# Patient Record
Sex: MALE | Race: WHITE | Employment: FULL TIME | URBAN - METROPOLITAN AREA
[De-identification: names, ages, dates, MRNs, and addresses within clinical notes are randomized per-mention and may not be internally consistent; named-entity substitution may affect disease eponyms.]

---

## 2017-08-08 ENCOUNTER — APPOINTMENT (OUTPATIENT)
Dept: LAB | Age: 47
End: 2017-08-08
Attending: INTERNAL MEDICINE
Payer: COMMERCIAL

## 2017-08-11 ENCOUNTER — LAB ENCOUNTER (OUTPATIENT)
Dept: LAB | Age: 47
End: 2017-08-11
Attending: DENTIST
Payer: COMMERCIAL

## 2017-08-11 DIAGNOSIS — Z00.01 ENCOUNTER FOR GENERAL ADULT MEDICAL EXAMINATION WITH ABNORMAL FINDINGS: Primary | ICD-10-CM

## 2017-08-11 LAB
ALBUMIN SERPL-MCNC: 4 G/DL (ref 3.5–4.8)
ALP LIVER SERPL-CCNC: 71 U/L (ref 45–117)
ALT SERPL-CCNC: 48 U/L (ref 17–63)
AST SERPL-CCNC: 28 U/L (ref 15–41)
BASOPHILS # BLD AUTO: 0.07 X10(3) UL (ref 0–0.1)
BASOPHILS NFR BLD AUTO: 1.2 %
BILIRUB SERPL-MCNC: 0.7 MG/DL (ref 0.1–2)
BILIRUB UR QL STRIP.AUTO: NEGATIVE
BUN BLD-MCNC: 11 MG/DL (ref 8–20)
CALCIUM BLD-MCNC: 9.3 MG/DL (ref 8.3–10.3)
CHLORIDE: 109 MMOL/L (ref 101–111)
CHOLEST SMN-MCNC: 225 MG/DL (ref ?–200)
CLARITY UR REFRACT.AUTO: CLEAR
CO2: 25 MMOL/L (ref 22–32)
COLOR UR AUTO: YELLOW
CREAT BLD-MCNC: 0.74 MG/DL (ref 0.7–1.3)
EOSINOPHIL # BLD AUTO: 0.06 X10(3) UL (ref 0–0.3)
EOSINOPHIL NFR BLD AUTO: 1.1 %
ERYTHROCYTE [DISTWIDTH] IN BLOOD BY AUTOMATED COUNT: 13.2 % (ref 11.5–16)
GLUCOSE BLD-MCNC: 93 MG/DL (ref 70–99)
GLUCOSE UR STRIP.AUTO-MCNC: NEGATIVE MG/DL
HCT VFR BLD AUTO: 44.1 % (ref 37–53)
HDLC SERPL-MCNC: 63 MG/DL (ref 45–?)
HDLC SERPL: 3.57 {RATIO} (ref ?–4.97)
HGB BLD-MCNC: 15 G/DL (ref 13–17)
IMMATURE GRANULOCYTE COUNT: 0.02 X10(3) UL (ref 0–1)
IMMATURE GRANULOCYTE RATIO %: 0.4 %
KETONES UR STRIP.AUTO-MCNC: NEGATIVE MG/DL
LDLC SERPL CALC-MCNC: 135 MG/DL (ref ?–130)
LDLC SERPL-MCNC: 27 MG/DL (ref 5–40)
LEUKOCYTE ESTERASE UR QL STRIP.AUTO: NEGATIVE
LYMPHOCYTES # BLD AUTO: 2.26 X10(3) UL (ref 0.9–4)
LYMPHOCYTES NFR BLD AUTO: 40.1 %
M PROTEIN MFR SERPL ELPH: 7.4 G/DL (ref 6.1–8.3)
MCH RBC QN AUTO: 30.8 PG (ref 27–33.2)
MCHC RBC AUTO-ENTMCNC: 34 G/DL (ref 31–37)
MCV RBC AUTO: 90.6 FL (ref 80–99)
MONOCYTES # BLD AUTO: 0.57 X10(3) UL (ref 0.1–0.6)
MONOCYTES NFR BLD AUTO: 10.1 %
NEUTROPHIL ABS PRELIM: 2.66 X10 (3) UL (ref 1.3–6.7)
NEUTROPHILS # BLD AUTO: 2.66 X10(3) UL (ref 1.3–6.7)
NEUTROPHILS NFR BLD AUTO: 47.1 %
NITRITE UR QL STRIP.AUTO: NEGATIVE
NONHDLC SERPL-MCNC: 162 MG/DL (ref ?–130)
PH UR STRIP.AUTO: 5 [PH] (ref 4.5–8)
PLATELET # BLD AUTO: 256 10(3)UL (ref 150–450)
POTASSIUM SERPL-SCNC: 4.4 MMOL/L (ref 3.6–5.1)
PROT UR STRIP.AUTO-MCNC: NEGATIVE MG/DL
PSA SERPL-MCNC: 0.77 NG/ML (ref 0.01–4)
RBC # BLD AUTO: 4.87 X10(6)UL (ref 4.3–5.7)
RBC UR QL AUTO: NEGATIVE
RED CELL DISTRIBUTION WIDTH-SD: 43.7 FL (ref 35.1–46.3)
SODIUM SERPL-SCNC: 141 MMOL/L (ref 136–144)
SP GR UR STRIP.AUTO: 1.02 (ref 1–1.03)
THYROXINE (T4): 5.2 UG/DL (ref 4.5–10.9)
TRIGLYCERIDES: 136 MG/DL (ref ?–150)
TSI SER-ACNC: 1.35 MIU/ML (ref 0.35–5.5)
UROBILINOGEN UR STRIP.AUTO-MCNC: <2 MG/DL
WBC # BLD AUTO: 5.6 X10(3) UL (ref 4–13)

## 2017-08-11 PROCEDURE — 80053 COMPREHEN METABOLIC PANEL: CPT

## 2017-08-11 PROCEDURE — 84403 ASSAY OF TOTAL TESTOSTERONE: CPT

## 2017-08-11 PROCEDURE — 84436 ASSAY OF TOTAL THYROXINE: CPT

## 2017-08-11 PROCEDURE — 84443 ASSAY THYROID STIM HORMONE: CPT

## 2017-08-11 PROCEDURE — 84153 ASSAY OF PSA TOTAL: CPT

## 2017-08-11 PROCEDURE — 36415 COLL VENOUS BLD VENIPUNCTURE: CPT

## 2017-08-11 PROCEDURE — 84402 ASSAY OF FREE TESTOSTERONE: CPT

## 2017-08-11 PROCEDURE — 81003 URINALYSIS AUTO W/O SCOPE: CPT

## 2017-08-11 PROCEDURE — 80061 LIPID PANEL: CPT

## 2017-08-11 PROCEDURE — 85025 COMPLETE CBC W/AUTO DIFF WBC: CPT

## 2017-08-14 LAB
TESTOSTERONE TOTAL: 95.9 NG/DL
TESTOSTERONE, FREE -MS/MS: 26.7 PG/ML

## 2017-10-12 ENCOUNTER — PRIOR ORIGINAL RECORDS (OUTPATIENT)
Dept: OTHER | Age: 47
End: 2017-10-12

## 2017-12-31 ENCOUNTER — PRIOR ORIGINAL RECORDS (OUTPATIENT)
Dept: OTHER | Age: 47
End: 2017-12-31

## 2019-01-24 ENCOUNTER — PRIOR ORIGINAL RECORDS (OUTPATIENT)
Dept: OTHER | Age: 49
End: 2019-01-24

## 2019-01-26 ENCOUNTER — PRIOR ORIGINAL RECORDS (OUTPATIENT)
Dept: OTHER | Age: 49
End: 2019-01-26

## 2019-01-30 ENCOUNTER — LAB ENCOUNTER (OUTPATIENT)
Dept: LAB | Age: 49
End: 2019-01-30
Attending: INTERNAL MEDICINE
Payer: COMMERCIAL

## 2019-01-30 DIAGNOSIS — C62.10: ICD-10-CM

## 2019-01-30 DIAGNOSIS — Z00.01 ENCOUNTER FOR GENERAL ADULT MEDICAL EXAMINATION WITH ABNORMAL FINDINGS: Primary | ICD-10-CM

## 2019-01-30 LAB
AFP-TM SERPL-MCNC: 2.6 NG/ML (ref ?–8)
ALBUMIN SERPL-MCNC: 4.2 G/DL (ref 3.1–4.5)
ALBUMIN/GLOB SERPL: 1.1 {RATIO} (ref 1–2)
ALP LIVER SERPL-CCNC: 80 U/L (ref 45–117)
ALT SERPL-CCNC: 43 U/L (ref 17–63)
ANION GAP SERPL CALC-SCNC: 9 MMOL/L (ref 0–18)
AST SERPL-CCNC: 29 U/L (ref 15–41)
B-HCG SERPL-ACNC: <1 MIU/ML
BASOPHILS # BLD AUTO: 0.05 X10(3) UL (ref 0–0.2)
BASOPHILS NFR BLD AUTO: 0.9 %
BILIRUB SERPL-MCNC: 0.9 MG/DL (ref 0.1–2)
BILIRUB UR QL STRIP.AUTO: NEGATIVE
BUN BLD-MCNC: 14 MG/DL (ref 8–20)
BUN/CREAT SERPL: 16.1 (ref 10–20)
CALCIUM BLD-MCNC: 9.7 MG/DL (ref 8.3–10.3)
CHLORIDE SERPL-SCNC: 106 MMOL/L (ref 101–111)
CHOLEST SMN-MCNC: 240 MG/DL (ref ?–200)
CLARITY UR REFRACT.AUTO: CLEAR
CO2 SERPL-SCNC: 26 MMOL/L (ref 22–32)
COLOR UR AUTO: YELLOW
CREAT BLD-MCNC: 0.87 MG/DL (ref 0.7–1.3)
DEPRECATED RDW RBC AUTO: 40.9 FL (ref 35.1–46.3)
EOSINOPHIL # BLD AUTO: 0.16 X10(3) UL (ref 0–0.7)
EOSINOPHIL NFR BLD AUTO: 2.8 %
ERYTHROCYTE [DISTWIDTH] IN BLOOD BY AUTOMATED COUNT: 12.4 % (ref 11–15)
GLOBULIN PLAS-MCNC: 3.9 G/DL (ref 2.8–4.4)
GLUCOSE BLD-MCNC: 119 MG/DL (ref 70–99)
GLUCOSE UR STRIP.AUTO-MCNC: NEGATIVE MG/DL
HCT VFR BLD AUTO: 46.5 % (ref 39–53)
HDLC SERPL-MCNC: 60 MG/DL (ref 40–59)
HGB BLD-MCNC: 15.7 G/DL (ref 13–17.5)
IMM GRANULOCYTES # BLD AUTO: 0.01 X10(3) UL (ref 0–1)
IMM GRANULOCYTES NFR BLD: 0.2 %
LDLC SERPL CALC-MCNC: 149 MG/DL (ref ?–100)
LEUKOCYTE ESTERASE UR QL STRIP.AUTO: NEGATIVE
LYMPHOCYTES # BLD AUTO: 2 X10(3) UL (ref 1–4)
LYMPHOCYTES NFR BLD AUTO: 34.5 %
M PROTEIN MFR SERPL ELPH: 8.1 G/DL (ref 6.4–8.2)
MCH RBC QN AUTO: 30.2 PG (ref 26–34)
MCHC RBC AUTO-ENTMCNC: 33.8 G/DL (ref 31–37)
MCV RBC AUTO: 89.4 FL (ref 80–100)
MONOCYTES # BLD AUTO: 0.61 X10(3) UL (ref 0.1–1)
MONOCYTES NFR BLD AUTO: 10.5 %
NEUTROPHILS # BLD AUTO: 2.96 X10 (3) UL (ref 1.5–7.7)
NEUTROPHILS # BLD AUTO: 2.96 X10(3) UL (ref 1.5–7.7)
NEUTROPHILS NFR BLD AUTO: 51.1 %
NITRITE UR QL STRIP.AUTO: NEGATIVE
NONHDLC SERPL-MCNC: 180 MG/DL (ref ?–130)
OSMOLALITY SERPL CALC.SUM OF ELEC: 294 MOSM/KG (ref 275–295)
PH UR STRIP.AUTO: 5 [PH] (ref 4.5–8)
PLATELET # BLD AUTO: 237 10(3)UL (ref 150–450)
POTASSIUM SERPL-SCNC: 4.8 MMOL/L (ref 3.6–5.1)
PROT UR STRIP.AUTO-MCNC: NEGATIVE MG/DL
RBC # BLD AUTO: 5.2 X10(6)UL (ref 4.3–5.7)
RBC UR QL AUTO: NEGATIVE
SODIUM SERPL-SCNC: 141 MMOL/L (ref 136–144)
SP GR UR STRIP.AUTO: 1.02 (ref 1–1.03)
T4 SERPL-MCNC: 5.3 UG/DL (ref 4.5–10.9)
TRIGL SERPL-MCNC: 155 MG/DL (ref 30–149)
TSI SER-ACNC: 1.65 MIU/ML (ref 0.35–5.5)
UROBILINOGEN UR STRIP.AUTO-MCNC: <2 MG/DL
VLDLC SERPL CALC-MCNC: 31 MG/DL (ref 0–30)
WBC # BLD AUTO: 5.8 X10(3) UL (ref 4–11)

## 2019-01-30 PROCEDURE — 85025 COMPLETE CBC W/AUTO DIFF WBC: CPT

## 2019-01-30 PROCEDURE — 80061 LIPID PANEL: CPT

## 2019-01-30 PROCEDURE — 84436 ASSAY OF TOTAL THYROXINE: CPT

## 2019-01-30 PROCEDURE — 84702 CHORIONIC GONADOTROPIN TEST: CPT

## 2019-01-30 PROCEDURE — 81003 URINALYSIS AUTO W/O SCOPE: CPT

## 2019-01-30 PROCEDURE — 36415 COLL VENOUS BLD VENIPUNCTURE: CPT

## 2019-01-30 PROCEDURE — 84443 ASSAY THYROID STIM HORMONE: CPT

## 2019-01-30 PROCEDURE — 80053 COMPREHEN METABOLIC PANEL: CPT

## 2019-01-30 PROCEDURE — 82105 ALPHA-FETOPROTEIN SERUM: CPT

## 2019-02-02 LAB — BETA HCG QUANT (TUMOR MARKER): <1 IU/L

## 2019-08-07 ENCOUNTER — OFFICE VISIT (OUTPATIENT)
Dept: OTHER | Facility: HOSPITAL | Age: 49
End: 2019-08-07
Attending: PREVENTIVE MEDICINE

## 2019-08-07 DIAGNOSIS — Z01.84 IMMUNITY STATUS TESTING: Primary | ICD-10-CM

## 2019-08-07 LAB
HBV SURFACE AB SER QL: NONREACTIVE
HBV SURFACE AB SERPL IA-ACNC: <3.1 MIU/ML

## 2019-08-07 PROCEDURE — 86706 HEP B SURFACE ANTIBODY: CPT

## 2020-01-08 ENCOUNTER — PRIOR ORIGINAL RECORDS (OUTPATIENT)
Dept: OTHER | Age: 50
End: 2020-01-08

## 2020-01-30 ENCOUNTER — LAB ENCOUNTER (OUTPATIENT)
Dept: LAB | Age: 50
End: 2020-01-30
Attending: INTERNAL MEDICINE
Payer: COMMERCIAL

## 2020-01-30 DIAGNOSIS — E29.1 TESTICULAR FAILURE: Primary | ICD-10-CM

## 2020-01-30 PROCEDURE — 36415 COLL VENOUS BLD VENIPUNCTURE: CPT

## 2020-01-30 PROCEDURE — 84402 ASSAY OF FREE TESTOSTERONE: CPT

## 2020-01-30 PROCEDURE — 84403 ASSAY OF TOTAL TESTOSTERONE: CPT

## 2020-02-04 LAB
TESTOSTERONE TOTAL: 41.8 NG/DL
TESTOSTERONE, FREE -MS/MS: 12.7 PG/ML

## 2020-02-10 ENCOUNTER — HOSPITAL ENCOUNTER (OUTPATIENT)
Dept: GENERAL RADIOLOGY | Age: 50
Discharge: HOME | End: 2020-02-10
Attending: INTERNAL MEDICINE
Payer: COMMERCIAL

## 2020-02-10 DIAGNOSIS — R05.9 COUGH: ICD-10-CM

## 2020-02-10 PROCEDURE — 71046 X-RAY EXAM CHEST 2 VIEWS: CPT | Performed by: INTERNAL MEDICINE

## 2020-10-11 VITALS
DIASTOLIC BLOOD PRESSURE: 84 MMHG | SYSTOLIC BLOOD PRESSURE: 132 MMHG | BODY MASS INDEX: 30.91 KG/M2 | WEIGHT: 233.2 LBS | HEIGHT: 73 IN

## 2020-12-31 ENCOUNTER — PRIOR ORIGINAL RECORDS (OUTPATIENT)
Dept: OTHER | Age: 50
End: 2020-12-31

## 2021-01-18 ENCOUNTER — ORDER TRANSCRIPTION (OUTPATIENT)
Dept: PHYSICAL THERAPY | Facility: HOSPITAL | Age: 51
End: 2021-01-18

## 2021-01-18 DIAGNOSIS — S76.012A STRAIN OF HIP AND THIGH, LEFT, INITIAL ENCOUNTER: Primary | ICD-10-CM

## 2021-01-18 DIAGNOSIS — S76.912A STRAIN OF HIP AND THIGH, LEFT, INITIAL ENCOUNTER: Primary | ICD-10-CM

## 2021-06-02 ENCOUNTER — HOSPITAL ENCOUNTER (OUTPATIENT)
Dept: CT IMAGING | Age: 51
Discharge: HOME | End: 2021-06-02
Attending: INTERNAL MEDICINE

## 2021-06-02 DIAGNOSIS — Z13.6 SCREENING FOR CARDIOVASCULAR CONDITION: ICD-10-CM

## 2021-06-24 ENCOUNTER — TELEPHONE (OUTPATIENT)
Dept: CARDIAC REHAB | Facility: HOSPITAL | Age: 51
End: 2021-06-24

## 2021-06-30 ENCOUNTER — TELEPHONE (OUTPATIENT)
Dept: CARDIAC REHAB | Facility: HOSPITAL | Age: 51
End: 2021-06-30

## 2022-08-17 ENCOUNTER — TELEPHONE (OUTPATIENT)
Dept: PHYSICAL THERAPY | Facility: HOSPITAL | Age: 52
End: 2022-08-17

## 2022-08-17 ENCOUNTER — ORDER TRANSCRIPTION (OUTPATIENT)
Dept: PHYSICAL THERAPY | Facility: HOSPITAL | Age: 52
End: 2022-08-17

## 2022-08-17 DIAGNOSIS — M22.41 CHONDROMALACIA PATELLAE, RIGHT KNEE: ICD-10-CM

## 2022-08-17 DIAGNOSIS — M22.42 CHONDROMALACIA PATELLAE, LEFT KNEE: Primary | ICD-10-CM

## 2022-09-16 ENCOUNTER — TELEPHONE (OUTPATIENT)
Dept: HEMATOLOGY/ONCOLOGY | Facility: HOSPITAL | Age: 52
End: 2022-09-16

## 2022-09-28 ENCOUNTER — OFFICE VISIT (OUTPATIENT)
Dept: HEMATOLOGY/ONCOLOGY | Age: 52
End: 2022-09-28
Attending: INTERNAL MEDICINE

## 2022-09-28 VITALS
HEART RATE: 87 BPM | HEIGHT: 73.5 IN | DIASTOLIC BLOOD PRESSURE: 91 MMHG | BODY MASS INDEX: 29.4 KG/M2 | TEMPERATURE: 97 F | SYSTOLIC BLOOD PRESSURE: 162 MMHG | OXYGEN SATURATION: 96 % | WEIGHT: 226.63 LBS

## 2022-09-28 DIAGNOSIS — Z85.47 HISTORY OF TESTICULAR CANCER: Primary | ICD-10-CM

## 2022-09-28 PROCEDURE — 99243 OFF/OP CNSLTJ NEW/EST LOW 30: CPT | Performed by: INTERNAL MEDICINE

## 2022-09-28 RX ORDER — ATORVASTATIN CALCIUM 20 MG/1
TABLET, FILM COATED ORAL
COMMUNITY
Start: 2022-09-23

## 2022-09-28 RX ORDER — CHOLECALCIFEROL (VITAMIN D3) 125 MCG
1 CAPSULE ORAL AS DIRECTED
COMMUNITY

## 2022-09-28 NOTE — PROGRESS NOTES
Patient is here for MD consult. Hx of testicular cancer. Diagnosed in 2002. At that time he had surgery and chemo. He has not had a follow up in quite some time. He would like to establish care at [facility]. He is feeling well. Denies pain. No lymphadenopathy.        Education Record    Learner:  Patient    Disease / Diagnosis:  Consult     Barriers / Limitations:  None   Comments:    Method:  Discussion   Comments:    General Topics:  Plan of care reviewed   Comments:    Outcome:  Shows understanding   Comments:

## 2022-10-04 ENCOUNTER — ORDER TRANSCRIPTION (OUTPATIENT)
Dept: PHYSICAL THERAPY | Facility: HOSPITAL | Age: 52
End: 2022-10-04

## 2022-10-04 DIAGNOSIS — M22.42 CHONDROMALACIA OF LEFT PATELLA: Primary | ICD-10-CM

## 2022-10-04 DIAGNOSIS — M22.41 CHONDROMALACIA OF RIGHT PATELLA: ICD-10-CM

## 2022-10-05 ENCOUNTER — TELEPHONE (OUTPATIENT)
Dept: PHYSICAL THERAPY | Facility: HOSPITAL | Age: 52
End: 2022-10-05

## 2022-10-06 ENCOUNTER — TELEPHONE (OUTPATIENT)
Dept: PHYSICAL THERAPY | Facility: HOSPITAL | Age: 52
End: 2022-10-06

## 2022-10-12 ENCOUNTER — TELEPHONE (OUTPATIENT)
Dept: PHYSICAL THERAPY | Facility: HOSPITAL | Age: 52
End: 2022-10-12

## 2022-10-13 ENCOUNTER — OFFICE VISIT (OUTPATIENT)
Dept: PHYSICAL THERAPY | Age: 52
End: 2022-10-13
Attending: INTERNAL MEDICINE
Payer: COMMERCIAL

## 2022-10-13 DIAGNOSIS — M25.561 ACUTE PAIN OF BOTH KNEES: Primary | ICD-10-CM

## 2022-10-13 DIAGNOSIS — M25.562 ACUTE PAIN OF BOTH KNEES: Primary | ICD-10-CM

## 2022-10-13 DIAGNOSIS — M79.652 PAIN IN BOTH THIGHS: ICD-10-CM

## 2022-10-13 DIAGNOSIS — M79.651 PAIN IN BOTH THIGHS: ICD-10-CM

## 2022-10-13 PROCEDURE — 97110 THERAPEUTIC EXERCISES: CPT

## 2022-10-13 PROCEDURE — 97161 PT EVAL LOW COMPLEX 20 MIN: CPT

## 2022-10-18 ENCOUNTER — OFFICE VISIT (OUTPATIENT)
Dept: PHYSICAL THERAPY | Age: 52
End: 2022-10-18
Attending: INTERNAL MEDICINE
Payer: COMMERCIAL

## 2022-10-18 DIAGNOSIS — M79.651 PAIN IN BOTH THIGHS: ICD-10-CM

## 2022-10-18 DIAGNOSIS — M25.562 ACUTE PAIN OF BOTH KNEES: Primary | ICD-10-CM

## 2022-10-18 DIAGNOSIS — M25.561 ACUTE PAIN OF BOTH KNEES: Primary | ICD-10-CM

## 2022-10-18 DIAGNOSIS — M79.652 PAIN IN BOTH THIGHS: ICD-10-CM

## 2022-10-18 PROCEDURE — 97110 THERAPEUTIC EXERCISES: CPT

## 2022-10-18 NOTE — PROGRESS NOTES
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 He also feels it when climbing stairs, or when biking. Did stretches at home, going ok. Pain: 5/10   @eval: Toshia Perry is a 54year old male who presents to therapy today with complaints of pain in Lf lateral knee/thigh, and some soreness/fatigue in thighs and pressure in kneecaps, over the past few months. Pt is , so sitting in the car a lot, which exacerbates his thigh/anterior knee discomfort after 10 minutes or more. Also feels dull ache/pressure in anterior thighs c stairs. Discomfort does not awaken him overnight. No previous injuries to knees. He also gets discomfort across low back if he is sitting for prolonged period; also feels it if he stands for too long, has to pause to stretch/stand straight up when he gets out of car, has been going on for many years and no change as of late. Went to a chiropractor for a period, a couple years ago. Pt describes pain level constant 2/10 thighs/ant knees, at worst intermittent 3-4/10 at lateral Lf knee. Current functional limitations include UA to run on treadmill 2o LE fatigue/discomfort. Con Dense describes prior level of function 30-40min walking on incline treadmill a couple times a week. Pt goals include make the pain go away. Past medical history was reviewed with Sd Padilla. Significant findings include hyperlipidemia, hx of testicular CA, PE.     Objective:   Pt reports momentary back discomfort c bed mobility during session today.  He reports also occasional twinges in low back if playing with his daughter on the floor, diffiulty getting up from floor. (note: at eval lumbar screen, mild restriction and discomfort c lumbar extension)  Patellar mobility WNL B  TBA future visit: B and SL squat, stepups    Treatment:  Manual Therapy: Added STM B quads c rollerstick 5min    Therapeutic Exercise:  Supine HS c strap 20\"x3 ea Rt, Lf   Prone quad c strap 20\"x3 ea Rt, Lf   SLR 5\"x10 ea Rt, Lf   Added Bridges 5\"x10  Added 4pt core stabilization c alt UE flexion 1min  Added 4pt core stabilization c alt LE extension 1min  Added seated core stab c UE depr into red SB 5\"x2min  Seated 3-way lumbar flexion slides on table 10\"x3 ea Rt, Lf  Added sidestepping YTB 2min    HEP: initial-supine HS stretch c strap, prone quad stretch c strap, SLR, seated lumbar lateral flexion stretch at table   10-18-22 pt to add sidestepping vs YTB    Assessment/Plan: pt reports continued symptoms isolated to sitting in squat car, stairs, or biking. He tolerated new ex's today to promote LE strength, but also for increase core stabilization to offer increased intrinsic lumbar support. Continue to monitor symptoms and address defiicts to work toward PLOF and set goals to resolve symptoms. PLAN OF CARE:    Goals: (to be met in 8 visits)  1. 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 Pt consistently able to transition from sitting to standing/walking without hesitation, need to stretch, or discomfort for PLOF  3. Pt consistently able to traverse stairs reciprocally s any LE discomfort for PLOF  4. incr hip abd/ext MMT at least 1/2 grade painfree for incr mm support to knees and lumbopelvis for decr symtpoms  5. incr B hamstring and quad flexibility to allow decr patellofemoral compression at rest for decr symptoms  6.  Pt able to tolerate 30 min incline treadmill walk s pain for PLOF  7. indep HEP to promote cont progress toward functional goals    Frequency / Duration: Patient will be seen for 2 x/week or a total of 8 visits over a 90 day period. All Treatments performed at distinct and separate times during the therapy session.   Treatment Minutes  Units charged   Manual Therapy 5 minutes    Therapeutic Activity 0 minutes    Neuromuscular Re-education 0 minutes    Therapeutic Exercise 40 minutes 3   Total Direct Treatment Time 45 minutes

## 2022-10-21 ENCOUNTER — OFFICE VISIT (OUTPATIENT)
Dept: PHYSICAL THERAPY | Age: 52
End: 2022-10-21
Attending: INTERNAL MEDICINE
Payer: COMMERCIAL

## 2022-10-21 DIAGNOSIS — M79.651 PAIN IN BOTH THIGHS: ICD-10-CM

## 2022-10-21 DIAGNOSIS — M25.562 ACUTE PAIN OF BOTH KNEES: Primary | ICD-10-CM

## 2022-10-21 DIAGNOSIS — M79.652 PAIN IN BOTH THIGHS: ICD-10-CM

## 2022-10-21 DIAGNOSIS — M25.561 ACUTE PAIN OF BOTH KNEES: Primary | ICD-10-CM

## 2022-10-21 PROCEDURE — 97110 THERAPEUTIC EXERCISES: CPT

## 2022-10-21 NOTE — PROGRESS NOTES
PT DAILY NOTE  Diagnosis:   Acute pain of both knees (M25.561, M25.562)  Pain in both thighs (M79.671, K62.555)    Referring Provider: Gomez Siemens Date of Evaluation:    10/13/2022    Precautions:  None Next MD visit:   none scheduled  Date of Surgery: n/a  Symptom onset: past few months     Insurance Primary/Secondary: [employer]     Visit 3 of 8   Date POC Expires: 12-31-22       Subjective: Pt states he could tell he felt looser in thighs after last session. He does continue to feel a fatigued feeling in thighs when sitting in squad car, like he had a heavy squatting workout. Not painful, just a tightness. He also notes for several years when he is sitting, he just can't get comfortable, like he has to move/restless legs 2o discomfort B groin. Pain: 0/10   @eval: Saul Burgos is a 54year old male who presents to therapy today with complaints of pain in Lf lateral knee/thigh, and some soreness/fatigue in thighs and pressure in kneecaps, over the past few months. Pt is , so sitting in the car a lot, which exacerbates his thigh/anterior knee discomfort after 10 minutes or more. Also feels dull ache/pressure in anterior thighs c stairs. Discomfort does not awaken him overnight. No previous injuries to knees. He also gets discomfort across low back if he is sitting for prolonged period; also feels it if he stands for too long, has to pause to stretch/stand straight up when he gets out of car, has been going on for many years and no change as of late. Went to a chiropractor for a period, a couple years ago. Pt describes pain level constant 2/10 thighs/ant knees, at worst intermittent 3-4/10 at lateral Lf knee. Current functional limitations include UA to run on treadmill 2o LE fatigue/discomfort. Niesha Cruz describes prior level of function 30-40min walking on incline treadmill a couple times a week. Pt goals include make the pain go away. Past medical history was reviewed with Niesha Cruz. Significant findings include hyperlipidemia, hx of testicular CA, PE.     Objective:   Pt did not report any pain c bed mobility today, which he did last visit  Pt requires cueing for proper form c wall squat to decr anterior knee stress; tolerated 10 reps c fatigue but no pain  TBA future visit: B and SL squat, stepups    Treatment:(\"NP\" indicates Not Performed this date)  Manual Therapy:  STM B quads c rollerstick 5min    Therapeutic Exercise:  Upright bike for CKC strengthening and ROM 5min  Supine HS c strap 20\"x3 ea Rt, Lf   Prone quad c strap 20\"x3 ea Rt, Lf   SLR 5\"x10 ea Rt, Lf   Bridges 5\"x15  4pt core stabilization c alt LE extension 2min (increased)  seated core stab c UE depr into red SB 5\"x2min  Seated 3-way lumbar flexion slides on table 10\"x3 ea Rt, Lf  sidestepping YTB 2min  Added paloff press c 2-plates 3\"H97 ea Rt, Lf   Added 1/4 Wall squats c SB 5\"x10    HEP: initial-supine HS stretch c strap, prone quad stretch c strap, SLR, seated lumbar lateral flexion stretch at table   10-18-22 pt to add sidestepping vs YTB    Assessment/Plan: pt did feel 'looser' in legs after last visit; difficult to keep HEP compliance during work week but now he will be off for 3 days. Reports chronic back discomfort and 'restlessness' in B hips/groin when sitting that has gone on many years. His symptoms are always when sitting. If pt gets no relief from PT course of care, lumbar workup may be warranted. Continue progressing core and hip/LE strength as tolerated. PLAN OF CARE:    Goals: (to be met in 8 visits)  1. 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 Pt consistently able to transition from sitting to standing/walking without hesitation, need to stretch, or discomfort for PLOF  3.  Pt consistently able to traverse stairs reciprocally s any LE discomfort for PLOF  4. incr hip abd/ext MMT at least 1/2 grade painfree for incr mm support to knees and lumbopelvis for decr symtpoms  5. incr B hamstring and quad flexibility to allow decr patellofemoral compression at rest for decr symptoms  6. Pt able to tolerate 30 min incline treadmill walk s pain for PLOF  7. indep HEP to promote cont progress toward functional goals    Frequency / Duration: Patient will be seen for 2 x/week or a total of 8 visits over a 90 day period. All Treatments performed at distinct and separate times during the therapy session.   Treatment Minutes  Units charged   Manual Therapy 5 minutes    Therapeutic Activity 0 minutes    Neuromuscular Re-education 0 minutes    Therapeutic Exercise 40 minutes 3   Total Direct Treatment Time 45 minutes

## 2022-10-26 ENCOUNTER — OFFICE VISIT (OUTPATIENT)
Dept: PHYSICAL THERAPY | Age: 52
End: 2022-10-26
Attending: INTERNAL MEDICINE
Payer: COMMERCIAL

## 2022-10-26 DIAGNOSIS — M79.652 PAIN IN BOTH THIGHS: ICD-10-CM

## 2022-10-26 DIAGNOSIS — M79.651 PAIN IN BOTH THIGHS: ICD-10-CM

## 2022-10-26 DIAGNOSIS — M25.561 ACUTE PAIN OF BOTH KNEES: Primary | ICD-10-CM

## 2022-10-26 DIAGNOSIS — M25.562 ACUTE PAIN OF BOTH KNEES: Primary | ICD-10-CM

## 2022-10-26 PROCEDURE — 97110 THERAPEUTIC EXERCISES: CPT

## 2022-10-26 NOTE — PROGRESS NOTES
PT DAILY NOTE  Diagnosis:   Acute pain of both knees (M25.561, M25.562)  Pain in both thighs (M79.671, Y18.672)    Referring Provider: Lester Vilella Date of Evaluation:    10/13/2022    Precautions:  None Next MD visit:   none scheduled  Date of Surgery: n/a  Symptom onset: past few months     Insurance Primary/Secondary: [employer]     Visit 4 of 8   Date POC Expires: 12-31-22       Subjective: Pt states he continues to have constant thigh tightness/discomfort when sitting, but it hasn't been as intense/less discomfort in nature. Pain: 0/10   @eval: Diogenes Friedman is a 54year old male who presents to therapy today with complaints of pain in Lf lateral knee/thigh, and some soreness/fatigue in thighs and pressure in kneecaps, over the past few months. Pt is , so sitting in the car a lot, which exacerbates his thigh/anterior knee discomfort after 10 minutes or more. Also feels dull ache/pressure in anterior thighs c stairs. Discomfort does not awaken him overnight. No previous injuries to knees. He also gets discomfort across low back if he is sitting for prolonged period; also feels it if he stands for too long, has to pause to stretch/stand straight up when he gets out of car, has been going on for many years and no change as of late. Went to a chiropractor for a period, a couple years ago. Pt describes pain level constant 2/10 thighs/ant knees, at worst intermittent 3-4/10 at lateral Lf knee. Current functional limitations include UA to run on treadmill 2o LE fatigue/discomfort. Camille Newman describes prior level of function 30-40min walking on incline treadmill a couple times a week. Pt goals include make the pain go away. Past medical history was reviewed with Camille Newman.  Significant findings include hyperlipidemia, hx of testicular CA, PE.     Objective:   Pt reports central low back discomfort after completing seated core stab UE pushdown into ball today - pt ed to avoid lumbar extension/activating extensors during this exercise, to stay in pelvic neutral and focus on engaging core. Pt to report pain if this occurs in future, as it occurs. Treatment:(\"NP\" indicates Not Performed this date)  Manual Therapy:  STM B quads c FR 5min    Therapeutic Exercise:  Upright bike for CKC strengthening and ROM 5min  Supine HS c strap 20\"x3 ea Rt, Lf   Prone quad c strap 20\"x3 ea Rt, Lf   SLR 1# 5\"x10 ea Rt, Lf (increased)  Bridges 5\"x15  4pt core stabilization c alt LE extension 2min  seated core stab c UE depr into red SB 5\"x2min  Seated 3-way lumbar flexion slides on table 10\"x3 ea Rt, Lf  sidestepping YTB 2min  paloff press c 2-plates 1\"K80 ea Rt, Lf   1/4 Wall squats c SB 5\"x10  Added forearm plank to table ht 66cm 1minx2    Future sessions: supine dead bug    HEP: initial-supine HS stretch c strap, prone quad stretch c strap, SLR, seated lumbar lateral flexion stretch at table   10-18-22 pt to add sidestepping vs YTB   10-26-22 pt to add wall squats and forearm plank to table surface    Assessment/Plan: pt reports mild improvement in severity of symptoms since last visit. He reports central low back discomfort after seated core stab exercise, likely 2o incr extensor activation. Pt educated to avoid this or alert therapist if pain does not resolve. He tolerated new forearm plank to table exercise, and is to add this and wall squats to HEP. Cont to address deficits. PLAN OF CARE:    Goals: (to be met in 8 visits)  1. 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 Pt consistently able to transition from sitting to standing/walking without hesitation, need to stretch, or discomfort for PLOF  3. Pt consistently able to traverse stairs reciprocally s any LE discomfort for PLOF  4. incr hip abd/ext MMT at least 1/2 grade painfree for incr mm support to knees and lumbopelvis for decr symtpoms  5.  incr B hamstring and quad flexibility to allow decr patellofemoral compression at rest for decr symptoms  6. Pt able to tolerate 30 min incline treadmill walk s pain for PLOF  7. indep HEP to promote cont progress toward functional goals    Frequency / Duration: Patient will be seen for 2 x/week or a total of 8 visits over a 90 day period. All Treatments performed at distinct and separate times during the therapy session.   Treatment Minutes  Units charged   Manual Therapy 5 minutes    Therapeutic Activity 0 minutes    Neuromuscular Re-education 0 minutes    Therapeutic Exercise 40 minutes 3   Total Direct Treatment Time 45 minutes

## 2022-11-01 ENCOUNTER — OFFICE VISIT (OUTPATIENT)
Dept: PHYSICAL THERAPY | Age: 52
End: 2022-11-01
Attending: INTERNAL MEDICINE
Payer: COMMERCIAL

## 2022-11-01 DIAGNOSIS — M25.561 ACUTE PAIN OF BOTH KNEES: Primary | ICD-10-CM

## 2022-11-01 DIAGNOSIS — M79.651 PAIN IN BOTH THIGHS: ICD-10-CM

## 2022-11-01 DIAGNOSIS — M25.562 ACUTE PAIN OF BOTH KNEES: Primary | ICD-10-CM

## 2022-11-01 DIAGNOSIS — M79.652 PAIN IN BOTH THIGHS: ICD-10-CM

## 2022-11-01 PROCEDURE — 97110 THERAPEUTIC EXERCISES: CPT

## 2022-11-01 NOTE — PROGRESS NOTES
PT DAILY NOTE  Diagnosis:   Acute pain of both knees (M25.561, M25.562)  Pain in both thighs (M79.671, E18.576)    Referring Provider: Princess Breaux Date of Evaluation:    10/13/2022    Precautions:  None Next MD visit:   none scheduled  Date of Surgery: n/a  Symptom onset: past few months     Insurance Primary/Secondary: [employer]     Visit 5 of 8   Date POC Expires: 12-31-22       Subjective: Pt states he feels good after leaving PT appts. Doing stretches at home eases his symptoms for a while. But he continues to feel ache in B anterior thighs when he sits for prolonged periods. He states his LBP is \"all the time', for a couple years, in certain positions/movements, pain enough to stop him from moving that direction. Has to slowly straighten up after sitting in the car. Pain: 0/10 at start of session  @eval: Yee Hernadez is a 54year old male who presents to therapy today with complaints of pain in Lf lateral knee/thigh, and some soreness/fatigue in thighs and pressure in kneecaps, over the past few months. Pt is , so sitting in the car a lot, which exacerbates his thigh/anterior knee discomfort after 10 minutes or more. Also feels dull ache/pressure in anterior thighs c stairs. Discomfort does not awaken him overnight. No previous injuries to knees. He also gets discomfort across low back if he is sitting for prolonged period; also feels it if he stands for too long, has to pause to stretch/stand straight up when he gets out of car, has been going on for many years and no change as of late. Went to a chiropractor for a period, a couple years ago. Pt describes pain level constant 2/10 thighs/ant knees, at worst intermittent 3-4/10 at lateral Lf knee. Current functional limitations include UA to run on treadmill 2o LE fatigue/discomfort. Raleigh Greenwood describes prior level of function 30-40min walking on incline treadmill a couple times a week. Pt goals include make the pain go away.   Past medical history was reviewed with Glen Guevara. Significant findings include hyperlipidemia, hx of testicular CA, PE.     Objective:   Pt demo pain at L-S junction c standing lumbar extension, consistent c eval  Pt reports relief c seated lumbar flexion stretches at home. Pt can add LTR to HEP as well. Pt notes LE fatigue c SLR and squat ex's    Treatment:(\"NP\" indicates Not Performed this date)  Manual Therapy:  STM B quads c FR 5min    Therapeutic Exercise:  Upright bike for CKC strengthening and ROM 5min  Supine HS c strap 20\"x3 ea Rt, Lf   Prone quad c strap 20\"x3 ea Rt, Lf  Added LTR 2min   SLR 1# 5\"x10 ea Rt, Lf  Bridges 5\"x15  4pt core stabilization c alt LE extension 2min  seated core stab c UE depr into red SB 5\"x2min-NP  Added supine dead bug c red SB push into lap, opp UE/LE extension 2min  Seated 3-way lumbar flexion slides on table 10\"x3 ea Rt, Lf-NP  sidestepping YTB 2min-NP  paloff press c 2-plates 5\"G53 ea Rt, Lf   1/4 Wall squats c SB 5\"x10  forearm plank to table ht 66cm 1minx2    HEP: initial-supine HS stretch c strap, prone quad stretch c strap, SLR, seated lumbar lateral flexion stretch at table   10-18-22 pt to add sidestepping vs YTB   10-26-22 pt to add wall squats and forearm plank to table surface   11-1-22 pt to add LTR    Assessment/Plan: pt continues to have thigh ache when sitting for prolonged periods, accompanied by chronic mild LBP. Added additional core stabilization/strength today c supine dead bug, which he tolerates well. He notes LE fatigue c LE strengthening ex's. Consistent c eval he cont to have discomfort c mild limitation c lumbar extension. Current ex's to improve segmental mobility as well as improve core stabilization to support lumbar spine. Cont to address deficits. PLAN OF CARE:    Goals: (to be met in 8 visits)  1. 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  Pt consistently able to transition from sitting to standing/walking without hesitation, need to stretch, or discomfort for PLOF  3. Pt consistently able to traverse stairs reciprocally s any LE discomfort for PLOF  4. incr hip abd/ext MMT at least 1/2 grade painfree for incr mm support to knees and lumbopelvis for decr symtpoms  5. incr B hamstring and quad flexibility to allow decr patellofemoral compression at rest for decr symptoms  6. Pt able to tolerate 30 min incline treadmill walk s pain for PLOF  7. indep HEP to promote cont progress toward functional goals    Frequency / Duration: Patient will be seen for 2 x/week or a total of 8 visits over a 90 day period. All Treatments performed at distinct and separate times during the therapy session.   Treatment Minutes  Units charged   Manual Therapy 5 minutes    Therapeutic Activity 0 minutes    Neuromuscular Re-education 0 minutes    Therapeutic Exercise 40 minutes 3   Total Direct Treatment Time 45 minutes

## 2022-11-04 ENCOUNTER — APPOINTMENT (OUTPATIENT)
Dept: PHYSICAL THERAPY | Age: 52
End: 2022-11-04
Attending: INTERNAL MEDICINE
Payer: COMMERCIAL

## 2023-01-13 ENCOUNTER — HOSPITAL ENCOUNTER (OUTPATIENT)
Dept: GENERAL RADIOLOGY | Age: 53
Discharge: HOME | End: 2023-01-13
Attending: INTERNAL MEDICINE
Payer: COMMERCIAL

## 2023-01-13 DIAGNOSIS — M54.9 DORSALGIA: ICD-10-CM

## 2023-01-13 PROCEDURE — 72100 X-RAY EXAM L-S SPINE 2/3 VWS: CPT | Performed by: INTERNAL MEDICINE

## 2023-06-13 ENCOUNTER — ORDER TRANSCRIPTION (OUTPATIENT)
Dept: PHYSICAL THERAPY | Facility: HOSPITAL | Age: 53
End: 2023-06-13

## 2023-06-13 DIAGNOSIS — S23.8XXA: Primary | ICD-10-CM

## 2023-06-13 DIAGNOSIS — M54.16 LUMBAR RADICULOPATHY: ICD-10-CM

## 2023-11-17 ENCOUNTER — HOSPITAL ENCOUNTER (OUTPATIENT)
Dept: GENERAL RADIOLOGY | Age: 53
Discharge: HOME | End: 2023-11-17
Attending: INTERNAL MEDICINE
Payer: COMMERCIAL

## 2023-11-17 DIAGNOSIS — R05.9 COUGH: ICD-10-CM

## 2023-11-17 PROCEDURE — 71046 X-RAY EXAM CHEST 2 VIEWS: CPT | Performed by: INTERNAL MEDICINE

## 2024-02-29 ENCOUNTER — ORDER TRANSCRIPTION (OUTPATIENT)
Dept: PHYSICAL THERAPY | Facility: HOSPITAL | Age: 54
End: 2024-02-29

## 2024-02-29 DIAGNOSIS — S23.8XXA: Primary | ICD-10-CM

## 2024-02-29 DIAGNOSIS — M54.16 LUMBAR RADICULOPATHY: ICD-10-CM

## 2024-03-25 ENCOUNTER — TELEPHONE (OUTPATIENT)
Dept: PHYSICAL THERAPY | Facility: HOSPITAL | Age: 54
End: 2024-03-25

## 2024-03-29 ENCOUNTER — OFFICE VISIT (OUTPATIENT)
Facility: LOCATION | Age: 54
End: 2024-03-29
Attending: INTERNAL MEDICINE
Payer: COMMERCIAL

## 2024-03-29 DIAGNOSIS — M54.16 LUMBAR RADICULOPATHY: Primary | ICD-10-CM

## 2024-03-29 PROCEDURE — 97110 THERAPEUTIC EXERCISES: CPT

## 2024-03-29 PROCEDURE — 97161 PT EVAL LOW COMPLEX 20 MIN: CPT

## 2024-03-29 NOTE — PROGRESS NOTES
SPINE EVALUATION:     Diagnosis:    Sprain of chest wall (S23.8XXA)  Lumbar radiculopathy (M54.16)      Referring Provider: Elpidio  Date of Evaluation:    3/29/2024    Precautions:  None Next MD visit:   none scheduled  Date of Surgery: n/a     PATIENT SUMMARY   Patrick Mcarthur is a 53 year old male who presents to therapy today with complaints of low back pain for years.  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 No previous injections in the past, no therapy. Has difficulty with standing long periods of time as well as walking. Denies any saddle anesthesia, denies pain with coughing, sneezing, straining, denies bladder or bowel issues, denies any leg weakness. Relieving activities include rest and self stretching. X-rays were taken over a year ago showing 3-4 mm anterior listhesis of L4 over L5 and L5-S1 disc space loss with osteoarthritic changes. Localizes pain midline of low back and can be sharp at times. Sensations in his thighs occur mostly with sitting. Occasional numbness in the front thigh down to foot. Describes it as a \"hot wire\" sensation.        Pt describes pain level current 0/10, at best 0/10, at worst 6/10.   Current functional limitations include sitting long periods of time at home or squad car, getting up from seated position, bending forward to  things, standing and walking long periods of time, donning/doffing shoes.     Patrick describes prior level of function with pain and restriction with varying levels of intensity. Pt goals include getting on treadmill again, working out, getting in and out of squad car .  Past medical history was reviewed with Patrick. Significant findings include  has a past medical history of Other and unspecified hyperlipidemia, PE (pulmonary embolism) (2002), Seminoma (HCC) (2001), and Testicular cancer (HCC) (2002).  Pt denies diplopia,  dysarthria, dysphasia, dizziness, drop attacks, bowel/bladder changes, saddle anesthesia, and LOLIS LE N/T.    ASSESSMENT  Patrick presents to physical therapy evaluation with primary c/o low back pain. The results of the objective tests and measures show restricted and painful thoracolumbar AROM, decreased flexibility, decreased core & LE strength, hip/lumbar spine mobility restrictions.  Functional deficits include but are not limited to sitting long periods of time, walking, standing, bending over to  things, work related activities, participating in workouts (treadmill).. Pt and PT discussed evaluation findings, pathology, POC and HEP.  Pt voiced understanding and performs HEP correctly without reported pain. Skilled Physical Therapy is medically necessary to address the above impairments and reach functional goals.     OBJECTIVE:   Observation/Posture: flat back, equal weight bearing through both LE    Gait Summary: pt ambulates on level ground with non-antalgic gait pattern, increased logan     Hip Clearing: decreased (B) hip ER (more prominent (R) hip)    Thoracolumbar AROM: (* denotes performed with pain)  Flexion 75% motion (intense stretch, pain upon returning to neutral position)  Extension <60% motion*  (R) SB 75% motion*  (L) SB 75% motion*    Special Tests: SIJ compression (-), SIJ distraction (-), Posterior Shear (-), Jami (-)    Flexibility:   LE (R) (L)   Hip flexor Mod Mod   Hamstring Max Max   Piriformis Mod Mod   Quad Mod Mod   Gastroc-soleus         Strength Summary:  MMT (Scale 0-5)  (R) (L)   Hip Flexion (L2) 5 5   Knee Extension (L3) 5 5   Ankle DF (L4) 5 5   Great Toe Extension (L5) 5 5   Ankle PF (S1) 5 5   Ankle Eversion (S1)     Hip Extension (S1) 4* 4*   Knee Flexion (S2) 5 5   Hip ER 5 5   Hip IR 5 5   Hip Abduction 4 4   Hip Adduction         Dural Signs: (B) SLR equivocal (significant increased tension)    Palpation:   Hypersensitive to touch  Tightness lumbar paraspinals &  posterolateral hip musculature     Accessory motion:   Decreased lumbar PA glides throughout (mild pain provocation noted)    Today’s Treatment and Response:   Pt education was provided on exam findings, treatment diagnosis, treatment plan, expectations, and prognosis. Pt was also provided recommendations for activity modifications, possible soreness after evaluation, modalities as needed [ice/heat], postural corrections, pain science education , detrimental fear avoidance behaviors, and importance of remaining active. Pt education regarding current symptoms, lumbar pathology, functional anatomy and biomechanics/pathomechanics of the lumbopelvic and hip region, avoidance of aggravating activities, positions of comfort, postural adjustments, adherence to HEP for optimal outcomes     Patient was instructed in and issued a HEP for:     Access Code: [email or web address]  URL: [email or web address]  Date: 03/29/2024  Prepared by: Max Baptiste    Exercises  - Supine Posterior Pelvic Tilt  - 1-2 x daily - 7 x weekly - 2 sets - 10 reps - 5 seconds hold  - Supine Piriformis Stretch with Foot on Ground  - 1-2 x daily - 7 x weekly - 3 sets - 30 seconds hold  - Supine Hamstring Stretch  - 1-2 x daily - 7 x weekly - 5 sets - 15 seconds hold  - Supine Sciatic Nerve Glide  - 1-2 x daily - 7 x weekly - 1 sets - 15 reps  - Supine Lower Trunk Rotation  - 1-2 x daily - 7 x weekly - 2 sets - 10 reps  - Seated Lumbar Flexion Stretch  - 1-2 x daily - 7 x weekly - 2 sets - 10 reps    Charges: PT Eval Low Complexity, TherEx 1 unit      Total Timed Treatment: 10 min     Total Treatment Time: 45 min     Based on clinical rationale and outcome measures, this evaluation involved Low Complexity decision making due to 1-2 personal factors/comorbidities, 3 body structures involved/activity limitations, and evolving symptoms including changing pain levels.  PLAN OF CARE:    Goals: (to be met in 10 visits)   Pt will improve transversus abdominis  recruitment to perform proper isometric contraction without requiring verbal or tactile cuing to promote advancement of therex   Pt will demonstrate good understanding of proper posture and body mechanics to decrease pain and improve spinal safety   Pt will improve lumbar spine AROM flexion to > 75% motion with less restriction  Pt will report improved symptom centralization and absence of radicular symptoms for 3 consecutive days to improve function with ADL   Pt will have decreased paraspinal mm tension to tolerate standing >60 minutes for work and home activities   Pt will report being able to perform ADLs and household chores/tasks with no pain  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 Treatment will include: Gait training, Manual Therapy, Neuromuscular Re-education, Self-Care Home Management, Therapeutic Activities, Therapeutic Exercise, Home Exercise Program instruction, and Modalities as necessary    Education or treatment limitation: None    Rehab Potential:good    Oswestry Disability Index Score  Score: 4 % (3/29/2024  2:12 PM)      Patient/Family/Caregiver was advised of these findings, precautions, and treatment options and has agreed to actively participate in planning and for this course of care.    Thank you for your referral. Please co-sign or sign and return this letter via fax as soon as possible to [phone]. If you have any questions, please contact me at Dept: [phone]    Sincerely,  Electronically signed by therapist: Max Baptiste, PT    Physician's certification required: Yes  I certify the need for these services furnished under  this plan of treatment and while under my care.    X___________________________________________________ Date____________________    Certification From: 3/29/2024  To:6/27/2024

## 2024-04-03 ENCOUNTER — OFFICE VISIT (OUTPATIENT)
Facility: LOCATION | Age: 54
End: 2024-04-03
Attending: INTERNAL MEDICINE
Payer: COMMERCIAL

## 2024-04-03 PROCEDURE — 97140 MANUAL THERAPY 1/> REGIONS: CPT

## 2024-04-03 PROCEDURE — 97110 THERAPEUTIC EXERCISES: CPT

## 2024-04-03 NOTE — PROGRESS NOTES
Diagnosis:   Sprain of chest wall (S23.8XXA)  Lumbar radiculopathy (M54.16)        Referring Provider: Tonny Magallanes  Date of Evaluation:    3/29/24    Precautions:  None Next MD visit:   none scheduled  Date of Surgery: n/a   Insurance Primary/Secondary: [employer] [place]MO / N/A     # Auth Visits: 10            Subjective: not sure if he was doing HS stretch correctly; performed exercises as instructed, no new complaints    Pain: 0/10      Objective: see flowsheet      Assessment:   Tolerated exercises without increased pain  No sensations into (L) thigh during session   Forward flexion exercise does seem to help getting up from sitting position       Goals:   Pt will improve transversus abdominis recruitment to perform proper isometric contraction without requiring verbal or tactile cuing to promote advancement of therex   Pt will demonstrate good understanding of proper posture and body mechanics to decrease pain and improve spinal safety   Pt will improve lumbar spine AROM flexion to > 75% motion with less restriction  Pt will report improved symptom centralization and absence of radicular symptoms for 3 consecutive days to improve function with ADL   Pt will have decreased paraspinal mm tension to tolerate standing >60 minutes for work and home activities   Pt will report being able to perform ADLs and household chores/tasks with no pain  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 with less reported pain and smoother transition   Pt will be able to stand to direct traffic, perform traffic stop with less reported pain  Pt will be independent and compliant with comprehensive HEP to maintain progress achieved in PT      Plan: improve lumbar & hip mobility, flexibility, core & LE strength  Date: 4/3/2024  TX#: 2/10 Date:                 TX#: 3/ Date:                 TX#: 4/ Date:                 TX#: 5/ Date:   Tx#: 6/   Manual Therapy (10 min)       Prone lumbosacral distraction  STM to (L) lateral thigh        TherEx (30 min)       Upright bike x 5 min       PPT x 20       H/L piriformis stretch 30 sec x 3       H/L active HS stretch 30 sec x 3       H/L sciatic nerve glide x 15 each side       SL clam GTB x 20       Child's pose 1 min        LTR x 20       DKTC x 10 (10 seconds)        SB calf stretch 30 sec x 2       Standing HS stretch 30 sec x 2       HEP:     Access Code: [email or web address]  URL: [email or web address]  Date: 03/29/2024  Prepared by: Max Baptiste    Exercises  - Supine Posterior Pelvic Tilt  - 1-2 x daily - 7 x weekly - 2 sets - 10 reps - 5 seconds hold  - Supine Piriformis Stretch with Foot on Ground  - 1-2 x daily - 7 x weekly - 3 sets - 30 seconds hold  - Supine Hamstring Stretch  - 1-2 x daily - 7 x weekly - 5 sets - 15 seconds hold  - Supine Sciatic Nerve Glide  - 1-2 x daily - 7 x weekly - 1 sets - 15 reps  - Supine Lower Trunk Rotation  - 1-2 x daily - 7 x weekly - 2 sets - 10 reps  - Seated Lumbar Flexion Stretch  - 1-2 x daily - 7 x weekly - 2 sets - 10 reps    Charges: Manual 1; TherEx 2       Total Timed Treatment: 40 min  Total Treatment Time: 40 min

## 2024-04-08 ENCOUNTER — OFFICE VISIT (OUTPATIENT)
Facility: LOCATION | Age: 54
End: 2024-04-08
Attending: INTERNAL MEDICINE
Payer: COMMERCIAL

## 2024-04-08 PROCEDURE — 97140 MANUAL THERAPY 1/> REGIONS: CPT

## 2024-04-08 PROCEDURE — 97110 THERAPEUTIC EXERCISES: CPT

## 2024-04-08 NOTE — PROGRESS NOTES
Diagnosis:   Sprain of chest wall (S23.8XXA)  Lumbar radiculopathy (M54.16)        Referring Provider: Tonny Magallanes  Date of Evaluation:    3/29/24    Precautions:  None Next MD visit:   none scheduled  Date of Surgery: n/a   Insurance Primary/Secondary: [employer] / N/A     # Auth Visits: 10            Subjective: a little less pronounced pain when getting up from a prolonged seated position    Pain: 0/10      Objective: see flowsheet      Assessment:   Tolerated exercise progressions well without increased pain  Demonstrates core strength deficits. Pt able to stand up from seated position without pain while transitioning to walking   Verbalized understanding of maintaining neutral spine, core engagement, & hip strengthening for stability of the lumbar spine  Flexion based exercises do seem to assist with symptoms reduction        Goals:   Pt will improve transversus abdominis recruitment to perform proper isometric contraction without requiring verbal or tactile cuing to promote advancement of therex   Pt will demonstrate good understanding of proper posture and body mechanics to decrease pain and improve spinal safety   Pt will improve lumbar spine AROM flexion to > 75% motion with less restriction  Pt will report improved symptom centralization and absence of radicular symptoms for 3 consecutive days to improve function with ADL   Pt will have decreased paraspinal mm tension to tolerate standing >60 minutes for work and home activities   Pt will report being able to perform ADLs and household chores/tasks with no pain  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 with less reported pain and smoother transition   Pt will be able to stand to direct traffic, perform traffic stop with less reported pain  Pt will be independent and compliant with comprehensive HEP to maintain progress achieved in PT      Plan: improve lumbar & hip mobility, flexibility, core & LE strength  Date: 4/3/2024  TX#: 2/10 Date:   4/8/24               TX#: 3/10 Date:                 TX#: 4/ Date:                 TX#: 5/ Date:   Tx#: 6/   Manual Therapy (10 min) Manual Therapy (10 min)      Prone lumbosacral distraction  STM to (L) lateral thigh Prone lumbosacral distraction  Prone lumbar PA glide, grade 3 (followed by 2)      TherEx (30 min) TherEx (30 min)      Upright bike x 5 min Upright bike x 5 min      PPT x 20 PPT x 20 (3 second hold)      H/L piriformis stretch 30 sec x 3 H/L TrA YPB push on thighs x 15 (5 second hold)      H/L active HS stretch 30 sec x 3 H/L TrA opposite leg extension x 15 each      H/L sciatic nerve glide x 15 each side H/L HS stretch x 5 (10 seconds)      SL clam GTB x 20 H/L piriformis stretch (knee to shoulder & knee down) 30 sec x 2      Child's pose 1 min  Child's pose 30 sec x 3      LTR x 20 SKTC x 10 (10 second hold) each      DKTC x 10 (10 seconds)  Side step w/GTB to fatigue (focus on TrA engagement)      SB calf stretch 30 sec x 2 Standing resistance band pull downs TrA engagement x 20 (3 second hold)      Standing HS stretch 30 sec x 2       HEP:     Access Code: [email or web address]  URL: [email or web address]  Date: 04/08/2024  Prepared by: Max Baptiste    Exercises  - Supine Posterior Pelvic Tilt  - 1 x daily - 7 x weekly - 2 sets - 10 reps - 5 seconds hold  - Supine Piriformis Stretch with Foot on Ground  - 1 x daily - 7 x weekly - 3 sets - 30 seconds hold  - Supine Hamstring Stretch  - 1 x daily - 7 x weekly - 5 sets - 15 seconds hold  - Supine Sciatic Nerve Glide  - 1 x daily - 7 x weekly - 1 sets - 15 reps  - Supine Lower Trunk Rotation  - 1 x daily - 7 x weekly - 2 sets - 10 reps  - Seated Lumbar Flexion Stretch  - 1 x daily - 7 x weekly - 2 sets - 10 reps  - Supine Figure 4 Piriformis Stretch  - 1 x daily - 7 x weekly - 3 sets - 30 seconds hold  - Clam with Resistance  - 1 x daily - 7 x weekly - 3 sets - 10 reps    Charges: Manual 1; TherEx 2       Total Timed Treatment: 40  min  Total Treatment Time: 40 min

## 2024-04-12 ENCOUNTER — OFFICE VISIT (OUTPATIENT)
Facility: LOCATION | Age: 54
End: 2024-04-12
Attending: INTERNAL MEDICINE
Payer: COMMERCIAL

## 2024-04-12 PROCEDURE — 97110 THERAPEUTIC EXERCISES: CPT

## 2024-04-12 PROCEDURE — 97140 MANUAL THERAPY 1/> REGIONS: CPT

## 2024-04-12 NOTE — PROGRESS NOTES
Diagnosis:   Sprain of chest wall (S23.8XXA)  Lumbar radiculopathy (M54.16)        Referring Provider: Tonny Magallanes  Date of Evaluation:    3/29/24    Precautions:  None Next MD visit:   none scheduled  Date of Surgery: n/a   Insurance Primary/Secondary: [employer] / N/A     # Auth Visits: 10            Subjective: has not been feeling his back as much going from sitting to standing position. Has not really felt hot wire sensation in leg    Pain: 0/10      Objective: see flowsheet      Assessment:   Pt with very tight HS, lumbar hypomobility (improves with manual treatment and exercise).   Tolerates exercises well with no increase in pain. Transitions from sit to stand are becoming less painful more frequently   Encouraged continued work on HEP for optimal outcomes.         Goals:   Pt will improve transversus abdominis recruitment to perform proper isometric contraction without requiring verbal or tactile cuing to promote advancement of therex   Pt will demonstrate good understanding of proper posture and body mechanics to decrease pain and improve spinal safety   Pt will improve lumbar spine AROM flexion to > 75% motion with less restriction  Pt will report improved symptom centralization and absence of radicular symptoms for 3 consecutive days to improve function with ADL   Pt will have decreased paraspinal mm tension to tolerate standing >60 minutes for work and home activities   Pt will report being able to perform ADLs and household chores/tasks with no pain  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 with less reported pain and smoother transition   Pt will be able to stand to direct traffic, perform traffic stop with less reported pain  Pt will be independent and compliant with comprehensive HEP to maintain progress achieved in PT      Plan: improve lumbar & hip mobility, flexibility, core & LE strength  Date: 4/3/2024  TX#: 2/10 Date:  4/8/24               TX#: 3/10 Date:  4/12/24                TX#: 4/10 Date:                 TX#: 5/ Date:   Tx#: 6/   Manual Therapy (10 min) Manual Therapy (10 min) Manual Therapy (10 min)     Prone lumbosacral distraction  STM to (L) lateral thigh Prone lumbosacral distraction  Prone lumbar PA glide, grade 3 (followed by 2) Prone lumbosacral distraction  Prone lumbar PA glide, grade 3 (followed by 2)     TherEx (30 min) TherEx (30 min) TherEx (30 min)     Upright bike x 5 min Upright bike x 5 min Upright bike x 6 min      PPT x 20 PPT x 20 (3 second hold) LTR x 20     H/L piriformis stretch 30 sec x 3 H/L TrA YPB push on thighs x 15 (5 second hold) PPT x 15 (5 second hold)      H/L active HS stretch 30 sec x 3 H/L TrA opposite leg extension x 15 each H/L TrA opposite leg extension x 15 each     H/L sciatic nerve glide x 15 each side H/L HS stretch x 5 (10 seconds) H/L HS stretch x 5 (15 second hold)      SL clam GTB x 20 H/L piriformis stretch (knee to shoulder & knee down) 30 sec x 2 Child's pose 30 sec x 3     Child's pose 1 min  Child's pose 30 sec x 3 Standing calf stretch 30 sec x 3     LTR x 20 SKTC x 10 (10 second hold) each Standing TrA resistance band pulldowns x 15 (5 second hold)     DKTC x 10 (10 seconds)  Side step w/GTB to fatigue (focus on TrA engagement) Seated lumbar flexion x 20     SB calf stretch 30 sec x 2 Standing resistance band pull downs TrA engagement x 20 (3 second hold) Seated piriformis stretch 30 sec x 2 (knee to shoulder & knee down)     Standing HS stretch 30 sec x 2       HEP:     Access Code: [email or web address]  URL: [email or web address]  Date: 04/08/2024  Prepared by: Max Baptiste    Exercises  - Supine Posterior Pelvic Tilt  - 1 x daily - 7 x weekly - 2 sets - 10 reps - 5 seconds hold  - Supine Piriformis Stretch with Foot on Ground  - 1 x daily - 7 x weekly - 3 sets - 30 seconds hold  - Supine Hamstring Stretch  - 1 x daily - 7 x weekly - 5 sets - 15 seconds hold  - Supine Sciatic Nerve Glide  - 1 x daily - 7 x weekly -  1 sets - 15 reps  - Supine Lower Trunk Rotation  - 1 x daily - 7 x weekly - 2 sets - 10 reps  - Seated Lumbar Flexion Stretch  - 1 x daily - 7 x weekly - 2 sets - 10 reps  - Supine Figure 4 Piriformis Stretch  - 1 x daily - 7 x weekly - 3 sets - 30 seconds hold  - Clam with Resistance  - 1 x daily - 7 x weekly - 3 sets - 10 reps    Charges: Manual 1; TherEx 2      Total Timed Treatment: 40 min  Total Treatment Time: 40 min

## 2024-04-17 ENCOUNTER — OFFICE VISIT (OUTPATIENT)
Facility: LOCATION | Age: 54
End: 2024-04-17
Attending: INTERNAL MEDICINE
Payer: COMMERCIAL

## 2024-04-17 PROCEDURE — 97110 THERAPEUTIC EXERCISES: CPT

## 2024-04-17 NOTE — PROGRESS NOTES
Diagnosis:   Sprain of chest wall (S23.8XXA)  Lumbar radiculopathy (M54.16)        Referring Provider: Tonny Magallanes  Date of Evaluation:    3/29/24    Precautions:  None Next MD visit:     Date of Surgery: n/a   Insurance Primary/Secondary: [employer] [place]MO / N/A     # Auth Visits: 10              Discharge Summary  Pt has attended 5 visits in Physical Therapy.     Subjective: feels a bit better getting out of squad car as well as prolonged sitting and standing positions. Less intense pain and can transition more quickly. No sensations in the thighs for quite awhile now. Wants to proceed with doing exercises on his own    Pain: 0/10      Objective:       Thoracolumbar AROM: (* denotes performed with pain)  (Evaluation): Flexion 75% motion (intense stretch, pain upon returning to neutral position); Extension <60% motion*; (R) SB 75% motion*; (L) SB 75% motion*  (Discharge Note): Flexion 80% motion; Extension 70% motion*; (R) % motion; (L) % motion      Flexibility:   (Evaluation):   LE (R) (L)   Hip flexor Mod Mod   Hamstring Max Max   Piriformis Mod Mod   Quad Mod Mod   Gastroc-soleus       (Discharge Note): Mild to mod (B) hip flexor & quad tightness, Mod to max (B) HS tightness (improving)      Strength Summary:  (Evaluation):   MMT (Scale 0-5)  (R) (L)   Hip Flexion (L2) 5 5   Knee Extension (L3) 5 5   Ankle DF (L4) 5 5   Great Toe Extension (L5) 5 5   Ankle PF (S1) 5 5   Ankle Eversion (S1)     Hip Extension (S1) 4* 4*   Knee Flexion (S2) 5 5   Hip ER 5 5   Hip IR 5 5   Hip Abduction 4 4   Hip Adduction       (Discharge Note): MMT (B) hip extension & abduction 5/5    Dural Signs:   (Evaluation): (B) SLR equivocal (significant increased tension)  (Discharge Note): (B) SLR (-) however increased tension throughout system    Palpation:   (Evaluation): Hypersensitive to touch, Tightness lumbar paraspinals & posterolateral hip musculature   (Discharge Note): mild lumbar paraspinal tightness; hypersensitivity  with touch (this is normal for patient)    Accessory motion:   (Evaluation): Decreased lumbar PA glides throughout (mild pain provocation noted)  (Discharge Note): Mild improvement in lumbar PA glide assessment        Assessment:   Pt reports less overall pain particularly with transitioning from a prolonged seated position to standing/walking (notes a bit a difference at home as well as getting out of squad car). Has not had any of the \"hot-wire\" sensations in the (L) thigh. Responding fairly well to treatment and continued performance of exercises. Does continue to have flexibility deficits, hip and lumbar mobility restrictions. Puts forth very good effort with therapy sessions and progressing towards his goals. Pt will continue HEP on his own for self management of symptoms         Goals:   Pt will improve transversus abdominis recruitment to perform proper isometric contraction without requiring verbal or tactile cuing to promote advancement of therex - met  Pt will demonstrate good understanding of proper posture and body mechanics to decrease pain and improve spinal safety - met  Pt will improve lumbar spine AROM flexion to > 75% motion with less restriction - met  Pt will report improved symptom centralization and absence of radicular symptoms for 3 consecutive days to improve function with ADL - met  Pt will have decreased paraspinal mm tension to tolerate standing >60 minutes for work and home activities - improving  Pt will report being able to perform ADLs and household chores/tasks with no pain - met  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 with less reported pain and smoother transition - improving  Pt will be able to stand to direct traffic, perform traffic stop with less reported pain - improving   Pt will be independent and compliant with comprehensive HEP to maintain progress achieved in PT - met    Oswestry Disability Index Score  Score: 4 % (3/29/2024  2:12 PM)    Post Oswestry  Disability Index Score  Post Score: 2 % (4/17/2024 10:59 AM)    2 % improvement    Plan: Continue skilled Physical Therapy 2 x/week for additional 5 visits (total 10) over a 90 day period.      Date: 4/3/2024  TX#: 2/10 Date:  4/8/24               TX#: 3/10 Date:  4/12/24               TX#: 4/10 Date: 4/17/24                TX#: 5/10 Date:   Tx#: 6/   Manual Therapy (10 min) Manual Therapy (10 min) Manual Therapy (10 min) Manual Therapy (NT)    Prone lumbosacral distraction  STM to (L) lateral thigh Prone lumbosacral distraction  Prone lumbar PA glide, grade 3 (followed by 2) Prone lumbosacral distraction  Prone lumbar PA glide, grade 3 (followed by 2)     TherEx (30 min) TherEx (30 min) TherEx (30 min) TherEx (40 min)    Upright bike x 5 min Upright bike x 5 min Upright bike x 6 min  Upright bike x 6 min     PPT x 20 PPT x 20 (3 second hold) LTR x 20 LTR x 20    H/L piriformis stretch 30 sec x 3 H/L TrA YPB push on thighs x 15 (5 second hold) PPT x 15 (5 second hold)  PPT x 15 (5 second hold)     H/L active HS stretch 30 sec x 3 H/L TrA opposite leg extension x 15 each H/L TrA opposite leg extension x 15 each H/L HS stretch x 5 (15 second hold)     H/L sciatic nerve glide x 15 each side H/L HS stretch x 5 (10 seconds) H/L HS stretch x 5 (15 second hold)  SKTC x 5 (10 second hold)    SL clam GTB x 20 H/L piriformis stretch (knee to shoulder & knee down) 30 sec x 2 Child's pose 30 sec x 3 Child's pose 30 sec x 3    Child's pose 1 min  Child's pose 30 sec x 3 Standing calf stretch 30 sec x 3 Standing calf stretch 30 sec x 3    LTR x 20 SKTC x 10 (10 second hold) each Standing TrA resistance band pulldowns x 15 (5 second hold) Standing hip flexor stretch on step 30 sec x 3    DKTC x 10 (10 seconds)  Side step w/GTB to fatigue (focus on TrA engagement) Seated lumbar flexion x 20 Standing TrA resistance band pulldowns x 20 (5 second hold)    SB calf stretch 30 sec x 2 Standing resistance band pull downs TrA engagement x  20 (3 second hold) Seated piriformis stretch 30 sec x 2 (knee to shoulder & knee down) Discharge objective testing, HEP review/updated/handout given    Standing HS stretch 30 sec x 2       HEP:     Access Code: [email or web address]  URL: [email or web address]  Date: 04/17/2024  Prepared by: Max Baptiste    Exercises  - Supine Posterior Pelvic Tilt  - 1 x daily - 7 x weekly - 2 sets - 10 reps - 5 seconds hold  - Supine Piriformis Stretch with Foot on Ground  - 1 x daily - 7 x weekly - 3 sets - 30 seconds hold  - Supine Hamstring Stretch  - 1 x daily - 7 x weekly - 5 sets - 15 seconds hold  - Supine Lower Trunk Rotation  - 1 x daily - 7 x weekly - 2 sets - 10 reps  - Seated Lumbar Flexion Stretch  - 1 x daily - 7 x weekly - 2 sets - 10 reps  - Supine Figure 4 Piriformis Stretch  - 1 x daily - 7 x weekly - 3 sets - 30 seconds hold  - Clam with Resistance  - 1 x daily - 7 x weekly - 3 sets - 10 reps  - Side Stepping with Resistance at Ankles  - 1 x daily - 7 x weekly  - Supine Transversus Abdominis Bracing with Leg Extension  - 1 x daily - 7 x weekly - 2 sets - 10 reps  - Child's Pose Stretch  - 1 x daily - 7 x weekly - 3 sets - 30 seconds hold  - Plank on Knees  - 1 x daily - 7 x weekly - 3 sets - 30 seconds hold    Charges: TherEx 3     Total Timed Treatment: 40 min  Total Treatment Time: 40 min